# Patient Record
Sex: MALE | Race: WHITE | NOT HISPANIC OR LATINO | Employment: OTHER | URBAN - METROPOLITAN AREA
[De-identification: names, ages, dates, MRNs, and addresses within clinical notes are randomized per-mention and may not be internally consistent; named-entity substitution may affect disease eponyms.]

---

## 2017-02-02 ENCOUNTER — ANESTHESIA (OUTPATIENT)
Dept: SURGERY | Facility: CLINIC | Age: 62
End: 2017-02-02
Payer: COMMERCIAL

## 2017-02-02 ENCOUNTER — ANESTHESIA EVENT (OUTPATIENT)
Dept: SURGERY | Facility: CLINIC | Age: 62
End: 2017-02-02
Payer: COMMERCIAL

## 2017-02-02 PROBLEM — N40.0 BPH (BENIGN PROSTATIC HYPERPLASIA): Status: ACTIVE | Noted: 2017-02-02

## 2017-02-02 PROCEDURE — 25000128 H RX IP 250 OP 636: Performed by: NURSE ANESTHETIST, CERTIFIED REGISTERED

## 2017-02-02 PROCEDURE — 25000128 H RX IP 250 OP 636: Performed by: UROLOGY

## 2017-02-02 PROCEDURE — 25800025 ZZH RX 258: Performed by: NURSE ANESTHETIST, CERTIFIED REGISTERED

## 2017-02-02 PROCEDURE — 25000125 ZZHC RX 250: Performed by: NURSE ANESTHETIST, CERTIFIED REGISTERED

## 2017-02-02 RX ORDER — PROPOFOL 10 MG/ML
INJECTION, EMULSION INTRAVENOUS PRN
Status: DISCONTINUED | OUTPATIENT
Start: 2017-02-02 | End: 2017-02-02

## 2017-02-02 RX ORDER — FENTANYL CITRATE 50 UG/ML
INJECTION, SOLUTION INTRAMUSCULAR; INTRAVENOUS PRN
Status: DISCONTINUED | OUTPATIENT
Start: 2017-02-02 | End: 2017-02-02

## 2017-02-02 RX ORDER — ONDANSETRON 2 MG/ML
INJECTION INTRAMUSCULAR; INTRAVENOUS PRN
Status: DISCONTINUED | OUTPATIENT
Start: 2017-02-02 | End: 2017-02-02

## 2017-02-02 RX ORDER — LIDOCAINE HYDROCHLORIDE 20 MG/ML
INJECTION, SOLUTION INFILTRATION; PERINEURAL PRN
Status: DISCONTINUED | OUTPATIENT
Start: 2017-02-02 | End: 2017-02-02

## 2017-02-02 RX ORDER — SODIUM CHLORIDE, SODIUM LACTATE, POTASSIUM CHLORIDE, CALCIUM CHLORIDE 600; 310; 30; 20 MG/100ML; MG/100ML; MG/100ML; MG/100ML
INJECTION, SOLUTION INTRAVENOUS CONTINUOUS PRN
Status: DISCONTINUED | OUTPATIENT
Start: 2017-02-02 | End: 2017-02-02

## 2017-02-02 RX ORDER — DEXAMETHASONE SODIUM PHOSPHATE 4 MG/ML
INJECTION, SOLUTION INTRA-ARTICULAR; INTRALESIONAL; INTRAMUSCULAR; INTRAVENOUS; SOFT TISSUE PRN
Status: DISCONTINUED | OUTPATIENT
Start: 2017-02-02 | End: 2017-02-02

## 2017-02-02 RX ADMIN — PROPOFOL 20 MG: 10 INJECTION, EMULSION INTRAVENOUS at 11:56

## 2017-02-02 RX ADMIN — LIDOCAINE HYDROCHLORIDE 100 MG: 20 INJECTION, SOLUTION INFILTRATION; PERINEURAL at 11:36

## 2017-02-02 RX ADMIN — MIDAZOLAM HYDROCHLORIDE 2 MG: 1 INJECTION, SOLUTION INTRAMUSCULAR; INTRAVENOUS at 11:34

## 2017-02-02 RX ADMIN — ONDANSETRON 4 MG: 2 INJECTION INTRAMUSCULAR; INTRAVENOUS at 11:42

## 2017-02-02 RX ADMIN — PROPOFOL 200 MG: 10 INJECTION, EMULSION INTRAVENOUS at 11:36

## 2017-02-02 RX ADMIN — DEXAMETHASONE SODIUM PHOSPHATE 4 MG: 4 INJECTION, SOLUTION INTRAMUSCULAR; INTRAVENOUS at 11:42

## 2017-02-02 RX ADMIN — SODIUM CHLORIDE, POTASSIUM CHLORIDE, SODIUM LACTATE AND CALCIUM CHLORIDE: 600; 310; 30; 20 INJECTION, SOLUTION INTRAVENOUS at 12:08

## 2017-02-02 RX ADMIN — SODIUM CHLORIDE, POTASSIUM CHLORIDE, SODIUM LACTATE AND CALCIUM CHLORIDE: 600; 310; 30; 20 INJECTION, SOLUTION INTRAVENOUS at 11:34

## 2017-02-02 RX ADMIN — CEFAZOLIN SODIUM 2 G: 2 INJECTION, SOLUTION INTRAVENOUS at 11:45

## 2017-02-02 RX ADMIN — DEXMEDETOMIDINE 8 MCG: 100 INJECTION, SOLUTION, CONCENTRATE INTRAVENOUS at 12:00

## 2017-02-02 RX ADMIN — FENTANYL CITRATE 100 MCG: 50 INJECTION, SOLUTION INTRAMUSCULAR; INTRAVENOUS at 11:34

## 2017-02-02 NOTE — ANESTHESIA POSTPROCEDURE EVALUATION
Patient: Ricardo Justice    Procedure(s):  CYSTOSCOPY TRANSURETHRAL RESECTION OF THE PROSTATE - Wound Class: II-Clean Contaminated    Diagnosis:BPH WITH URINARY OBSTRUCTION  Diagnosis Additional Information: No value filed.    Anesthesia Type:  General, LMA    Note:  Anesthesia Post Evaluation    Patient location during evaluation: PACU  Patient participation: Able to fully participate in evaluation  Level of consciousness: awake and alert  Pain management: adequate  Airway patency: patent  Cardiovascular status: acceptable  Respiratory status: acceptable  Hydration status: acceptable  PONV: none and controlled     Anesthetic complications: None          Last vitals:  Filed Vitals:    02/02/17 1313 02/02/17 1410 02/02/17 1425   BP: 147/86 124/70 124/71   Temp:      Resp: 17 16    SpO2: 96% 98%          Electronically Signed By: Alfred Preston MD  February 2, 2017  2:53 PM

## 2017-02-02 NOTE — ANESTHESIA CARE TRANSFER NOTE
Patient: Ricardo Justice    Procedure(s):  CYSTOSCOPY TRANSURETHRAL RESECTION OF THE PROSTATE - Wound Class: II-Clean Contaminated    Diagnosis: BPH WITH URINARY OBSTRUCTION  Diagnosis Additional Information: No value filed.    Anesthesia Type:   General, LMA     Note:  Airway :Face Mask  Patient transferred to:PACU  Comments: Spontaneous respirations, airway patent, LMA removed atraumatically. Oxygen via face mask at 10 LPM to PACU, connected to wall O2 in PACU. All monitors and alarms on and functioning. Report given to PACU RN and questions answered.       Vitals: (Last set prior to Anesthesia Care Transfer)    CRNA VITALS  2/2/2017 1154 - 2/2/2017 1232      2/2/2017             NIBP: 134/82 mmHg    NIBP Mean: 96                Electronically Signed By: NIHARIKA Martínez CRNA  February 2, 2017  12:32 PM

## 2017-02-02 NOTE — ANESTHESIA PREPROCEDURE EVALUATION
Procedure: Procedure(s):  COMBINED CYSTOSCOPY, TRANSURETHRAL RESECTION (TUR) PROSTATE  Preop diagnosis: BPH WITH URINARY OBSTRUCTION    Allergies   Allergen Reactions     Sulfa Drugs      Past Medical History   Diagnosis Date     Colon polyps      Sarcoidosis (H)      BPH (benign prostatic hyperplasia)      Blastomycosis      HX     Past Surgical History   Procedure Laterality Date     Ercp       BILE DUCT OBSTRUCTION     Gi surgery       HEMORRHOIDS     Ent surgery       T&A     Colonoscopy       Prior to Admission medications    Medication Sig Start Date End Date Taking? Authorizing Provider   FINASTERIDE PO Take 5 mg by mouth daily   Yes Reported, Patient   Misc Natural Products (GLUCOSAMINE CHONDROITIN ADV) TABS Take 2 tablets by mouth daily   Yes Reported, Patient   multivitamin, therapeutic with minerals (MULTI-VITAMIN) TABS tablet Take 1 tablet by mouth daily   Yes Reported, Patient   alfuzosin (UROXATRAL) 10 MG 24 hr tablet Take 10 mg by mouth daily   Yes Reported, Patient     Current Facility-Administered Medications Ordered in Epic   Medication Dose Route Frequency Last Rate Last Dose     ceFAZolin sodium-dextrose (ANCEF) infusion 2 g  2 g Intravenous Pre-Op/Pre-procedure x 1 dose         No current [facility]-ordered outpatient prescriptions on file.     Wt Readings from Last 1 Encounters:   02/02/17 97.977 kg (216 lb)     Temp Readings from Last 1 Encounters:   02/02/17 35.8  C (96.4  F) Oral     BP Readings from Last 6 Encounters:   02/02/17 147/82     Pulse Readings from Last 4 Encounters:   No data found for Pulse     Resp Readings from Last 1 Encounters:   02/02/17 16     SpO2 Readings from Last 1 Encounters:   No data found for SpO2     No results for input(s): NA, POTASSIUM, CHLORIDE, CO2, ANIONGAP, GLC, BUN, CR, PEE in the last 11527 hours.  No results for input(s): WBC, HGB, PLT in the last 92929 hours.  No results for input(s): INR in the last 99617 hours.    Invalid input(s): APTT   RECENT LABS:    ECG:   ECHO:   CXR:      Anesthesia Evaluation     . Pt has had prior anesthetic.     No history of anesthetic complications     ROS/MED HX    ENT/Pulmonary: Comment: Blastomycosis no symptoms    (+), . Other pulmonary disease sarcoidosis.   (-) sleep apnea   Neurologic:       Cardiovascular:         METS/Exercise Tolerance:     Hematologic:         Musculoskeletal:         GI/Hepatic:        (-) GERD   Renal/Genitourinary:     (+) BPH,       Endo:         Psychiatric:         Infectious Disease:         Malignancy:         Other:               Physical Exam  Normal systems: cardiovascular and pulmonary    Airway   Mallampati: I  Neck ROM: full    Dental   (+) caps    Cardiovascular       Pulmonary                     Anesthesia Plan      History & Physical Review  History and physical reviewed and following examination; no interval change.    ASA Status:  2 .    NPO Status:  > 8 hours    Plan for General and LMA with Intravenous induction. Maintenance will be Balanced.    PONV prophylaxis:  Ondansetron (or other 5HT-3)       Postoperative Care  Postoperative pain management:  IV analgesics.      Consents  Anesthetic plan, risks, benefits and alternatives discussed with:  Patient..                          .

## 2023-08-07 ENCOUNTER — HOSPITAL ENCOUNTER (EMERGENCY)
Facility: CLINIC | Age: 68
Discharge: HOME | End: 2023-08-08
Attending: EMERGENCY MEDICINE | Admitting: EMERGENCY MEDICINE
Payer: COMMERCIAL

## 2023-08-07 VITALS
BODY MASS INDEX: 29.95 KG/M2 | SYSTOLIC BLOOD PRESSURE: 170 MMHG | DIASTOLIC BLOOD PRESSURE: 96 MMHG | HEART RATE: 92 BPM | WEIGHT: 233.25 LBS | OXYGEN SATURATION: 100 % | RESPIRATION RATE: 20 BRPM | TEMPERATURE: 98.4 F

## 2023-08-07 DIAGNOSIS — L03.115 CELLULITIS OF RIGHT LOWER LEG: ICD-10-CM

## 2023-08-07 LAB
ANION GAP SERPL CALCULATED.3IONS-SCNC: 12 MMOL/L (ref 7–15)
BASOPHILS # BLD AUTO: 0 10E3/UL (ref 0–0.2)
BASOPHILS NFR BLD AUTO: 0 %
BUN SERPL-MCNC: 13.9 MG/DL (ref 8–23)
CALCIUM SERPL-MCNC: 9.9 MG/DL (ref 8.8–10.2)
CHLORIDE SERPL-SCNC: 95 MMOL/L (ref 98–107)
CREAT SERPL-MCNC: 1.17 MG/DL (ref 0.67–1.17)
DEPRECATED HCO3 PLAS-SCNC: 27 MMOL/L (ref 22–29)
EOSINOPHIL # BLD AUTO: 0 10E3/UL (ref 0–0.7)
EOSINOPHIL NFR BLD AUTO: 0 %
ERYTHROCYTE [DISTWIDTH] IN BLOOD BY AUTOMATED COUNT: 12 % (ref 10–15)
GFR SERPL CREATININE-BSD FRML MDRD: 68 ML/MIN/1.73M2
GLUCOSE SERPL-MCNC: 126 MG/DL (ref 70–99)
HCT VFR BLD AUTO: 46.5 % (ref 40–53)
HGB BLD-MCNC: 16.1 G/DL (ref 13.3–17.7)
HOLD SPECIMEN: NORMAL
IMM GRANULOCYTES # BLD: 0 10E3/UL
IMM GRANULOCYTES NFR BLD: 0 %
LACTATE SERPL-SCNC: 0.8 MMOL/L (ref 0.7–2)
LYMPHOCYTES # BLD AUTO: 0.6 10E3/UL (ref 0.8–5.3)
LYMPHOCYTES NFR BLD AUTO: 7 %
MCH RBC QN AUTO: 31.6 PG (ref 26.5–33)
MCHC RBC AUTO-ENTMCNC: 34.6 G/DL (ref 31.5–36.5)
MCV RBC AUTO: 91 FL (ref 78–100)
MONOCYTES # BLD AUTO: 0.6 10E3/UL (ref 0–1.3)
MONOCYTES NFR BLD AUTO: 7 %
NEUTROPHILS # BLD AUTO: 7.6 10E3/UL (ref 1.6–8.3)
NEUTROPHILS NFR BLD AUTO: 86 %
NRBC # BLD AUTO: 0 10E3/UL
NRBC BLD AUTO-RTO: 0 /100
PLATELET # BLD AUTO: 177 10E3/UL (ref 150–450)
POTASSIUM SERPL-SCNC: 3.8 MMOL/L (ref 3.4–5.3)
RBC # BLD AUTO: 5.09 10E6/UL (ref 4.4–5.9)
SODIUM SERPL-SCNC: 134 MMOL/L (ref 136–145)
WBC # BLD AUTO: 8.9 10E3/UL (ref 4–11)

## 2023-08-07 PROCEDURE — 83605 ASSAY OF LACTIC ACID: CPT | Performed by: EMERGENCY MEDICINE

## 2023-08-07 PROCEDURE — 250N000011 HC RX IP 250 OP 636: Performed by: EMERGENCY MEDICINE

## 2023-08-07 PROCEDURE — 99285 EMERGENCY DEPT VISIT HI MDM: CPT | Mod: 25

## 2023-08-07 PROCEDURE — 96365 THER/PROPH/DIAG IV INF INIT: CPT

## 2023-08-07 PROCEDURE — 36415 COLL VENOUS BLD VENIPUNCTURE: CPT | Performed by: EMERGENCY MEDICINE

## 2023-08-07 PROCEDURE — 85004 AUTOMATED DIFF WBC COUNT: CPT | Performed by: EMERGENCY MEDICINE

## 2023-08-07 PROCEDURE — 80048 BASIC METABOLIC PNL TOTAL CA: CPT | Performed by: EMERGENCY MEDICINE

## 2023-08-07 RX ORDER — CEPHALEXIN 500 MG/1
500 CAPSULE ORAL 4 TIMES DAILY
Qty: 40 CAPSULE | Refills: 0 | Status: SHIPPED | OUTPATIENT
Start: 2023-08-07 | End: 2023-08-17

## 2023-08-07 RX ORDER — ONDANSETRON 4 MG/1
4 TABLET, ORALLY DISINTEGRATING ORAL ONCE
Status: COMPLETED | OUTPATIENT
Start: 2023-08-07 | End: 2023-08-07

## 2023-08-07 RX ORDER — CEFAZOLIN SODIUM 2 G/100ML
2 INJECTION, SOLUTION INTRAVENOUS ONCE
Status: COMPLETED | OUTPATIENT
Start: 2023-08-07 | End: 2023-08-08

## 2023-08-07 RX ORDER — CEFAZOLIN SODIUM 1 G/3ML
1 INJECTION, POWDER, FOR SOLUTION INTRAMUSCULAR; INTRAVENOUS ONCE
Status: DISCONTINUED | OUTPATIENT
Start: 2023-08-07 | End: 2023-08-07

## 2023-08-07 RX ADMIN — CEFAZOLIN SODIUM 2 G: 2 INJECTION, SOLUTION INTRAVENOUS at 23:46

## 2023-08-07 RX ADMIN — ONDANSETRON 4 MG: 4 TABLET, ORALLY DISINTEGRATING ORAL at 22:50

## 2023-08-07 ASSESSMENT — ACTIVITIES OF DAILY LIVING (ADL): ADLS_ACUITY_SCORE: 33

## 2023-08-07 NOTE — LETTER
August 7, 2023      To Whom It May Concern:      Colton BENZ was seen in our Emergency Department today, 08/07/23.  I expect his condition to improve over the next few days.  He may return to work/school when improved.    Sincerely,        VERONICA Haas

## 2023-08-08 ENCOUNTER — APPOINTMENT (OUTPATIENT)
Dept: ULTRASOUND IMAGING | Facility: CLINIC | Age: 68
End: 2023-08-08
Attending: EMERGENCY MEDICINE
Payer: COMMERCIAL

## 2023-08-08 PROCEDURE — 93971 EXTREMITY STUDY: CPT | Mod: RT

## 2023-08-08 RX ORDER — ACETAMINOPHEN 325 MG/1
650 TABLET ORAL ONCE
Status: DISCONTINUED | OUTPATIENT
Start: 2023-08-08 | End: 2023-08-08 | Stop reason: HOSPADM

## 2023-08-08 ASSESSMENT — ACTIVITIES OF DAILY LIVING (ADL): ADLS_ACUITY_SCORE: 35

## 2023-08-08 NOTE — ED PROVIDER NOTES
History     Chief Complaint:  Leg Injury       The history is provided by the patient and the spouse.      Ricardo Justice is a 68 year old male with history of local skin infection who presents with a right leg injury and wound. The patient reports that [place] cabin [place] 2 when he ran over a rock with his lawnmower which resulted in blunt trauma to his right leg with the rock hitting his right leg. He mentions that his neighbor is a cardiologist and he dressed the wound and recommended that he see hs PCP. After seeing his PCP, he was prescribed antibiotics. Since then, he has been doing well, but notes having drainage from the wound. However, on 08/05, he went on a bike ride, and when he returned he had increased skin redness, drainage, subjective fever, nausea, leg swelling, and decreased appetite. Ricardo denies abdominal pain. He is allergic to sulfa medications.    Independent Historian:   Spouse - supplemented history    Review of External Notes:   None       Medications:    Uroxatral  Cipro  Finasteride  Lortab  Glucosamine chondroitin  Senna-docusate    Past Medical History:    Blastomycosis  BPH  Colon polyps  Sarcoidosis  Local skin infection   Adenopathy   Gastric ulcer    Past Surgical History:    Colonoscopy  Cystoscopy and TURP combined  T&A  Endoscopic retrograde cholangiopancreatography  Hemorrhoid removal     Physical Exam   Patient Vitals for the past 24 hrs:   BP Temp Temp src Pulse Resp SpO2 Weight   08/07/23 2031 (!) 170/96 98.4  F (36.9  C) Oral -- -- -- --   08/07/23 2030 -- -- -- 92 20 100 % 105.8 kg (233 lb 4 oz)      Physical Exam  General: the patient is awake and interactive  HEENT:  Moist mucous membranes, conjunctiva normal  Pulmonary:  Normal respiratory effort  Cardiovascular:  Well perfused  Musculoskeletal:  Moving 4 extremities grossly wnl, no deformities; DP/PT pulse 2+.  Skin:  Erythema and warmth to the RLE with noted scabbed wound over distal RLE.  1+ pitting edema  to the level of the mid tibia.  No skin crepitus. No hemorrhagic bullae.  RLE compartment soft throughout.   Neuro:  Speech normal, no focal deficits; SILT throughout RLE.   Psych:  Normal affect        Emergency Department Course     Imaging:  US Lower Extremity Venous Duplex Right   Final Result   IMPRESSION:   1.  No deep venous thrombosis in the right lower extremity.         Report per radiology    Laboratory:  Labs Ordered and Resulted from Time of ED Arrival to Time of ED Departure   BASIC METABOLIC PANEL - Abnormal       Result Value    Sodium 134 (*)     Potassium 3.8      Chloride 95 (*)     Carbon Dioxide (CO2) 27      Anion Gap 12      Urea Nitrogen 13.9      Creatinine 1.17      Calcium 9.9      Glucose 126 (*)     GFR Estimate 68     CBC WITH PLATELETS AND DIFFERENTIAL - Abnormal    WBC Count 8.9      RBC Count 5.09      Hemoglobin 16.1      Hematocrit 46.5      MCV 91      MCH 31.6      MCHC 34.6      RDW 12.0      Platelet Count 177      % Neutrophils 86      % Lymphocytes 7      % Monocytes 7      % Eosinophils 0      % Basophils 0      % Immature Granulocytes 0      NRBCs per 100 WBC 0      Absolute Neutrophils 7.6      Absolute Lymphocytes 0.6 (*)     Absolute Monocytes 0.6      Absolute Eosinophils 0.0      Absolute Basophils 0.0      Absolute Immature Granulocytes 0.0      Absolute NRBCs 0.0     LACTIC ACID WHOLE BLOOD - Normal    Lactic Acid 0.8       Emergency Department Course & Assessments:       Interventions:  Medications   ondansetron (ZOFRAN ODT) ODT tab 4 mg (4 mg Oral $Given 8/7/23 2250)   ceFAZolin (ANCEF) 2 g in 100 mL D5W intermittent infusion (0 g Intravenous Stopped 8/8/23 0031)        Assessments:  2312 I obtained history and examined the patient as noted above.     Independent Interpretation (X-rays, CTs, rhythm strip):  None    Social Determinants of Health affecting care:   None    Disposition:  The patient was discharged to home.     Impression & Plan      Medical Decision  [place]akin-year-old male presenting to the ER for evaluation of right leg erythema and swelling.  Please call for details of HPI and exam.  Initially treated with antibiotics after blunt trauma from a rock .  Erythema and warmth noted after bike rated 8/5 with subjective fevers.  He is afebrile vitally stable and well-appearing here.  Overall exam concerning for right leg cellulitis.  No signs of abscess, osteomyelitis, or necrotizing infection.  Lab studies notable for normal CBC and lactic acid.  Right lower extremity ultrasound is negative for DVT.  Patient was given Ancef here.  Given his overall well appearance and reassuring lab work, I feel he is safe to discharge home with oral antibiotics.  Area of erythema was outlined here in the ER.  Discussed follow-up with PCP to ensure resolution of right leg erythema.  Discussed return precautions for the emergency department.  Patient and wife are comfortable with this plan.  All questions were answered prior to discharge.    Diagnosis:    ICD-10-CM    1. Cellulitis of right lower leg  L03.115            Discharge Medications:  Discharge Medication List as of 2023  2:01 AM        START taking these medications    Details   cephALEXin (KEFLEX) 500 MG capsule Take 1 capsule (500 mg) by mouth 4 times daily for 10 days, Disp-40 capsule, R-0, E-Prescribe            Scribe Disclosure:  I, Erick Salazar, am serving as a scribe at 1:57 AM on 2023 to document services personally performed by Santosh Quintero MD, based on my observations and the provider's statements to me.   2023   Santosh Quintero MD [place], Edgar Ronald, MD  23 [id number]

## 2023-08-08 NOTE — ED TRIAGE NOTES
Pt arrives with concerns for infection to the right leg. Pt states trauma in mid July, pt was seen by health professional prior but states noticed swelling and redness start on Saturday night. Pt states discomfort and minimal pain to the area. ABCS intact and Aox4.       Triage Assessment       Row Name 08/07/23 2030       Triage Assessment (Adult)    Airway WDL WDL       Respiratory WDL    Respiratory WDL WDL       Peripheral/Neurovascular WDL    Peripheral Neurovascular WDL WDL